# Patient Record
Sex: FEMALE | Race: WHITE | Employment: OTHER | URBAN - METROPOLITAN AREA
[De-identification: names, ages, dates, MRNs, and addresses within clinical notes are randomized per-mention and may not be internally consistent; named-entity substitution may affect disease eponyms.]

---

## 2017-03-05 ENCOUNTER — OFFICE VISIT (OUTPATIENT)
Dept: FAMILY MEDICINE CLINIC | Age: 52
End: 2017-03-05

## 2017-03-05 VITALS
OXYGEN SATURATION: 97 % | BODY MASS INDEX: 30.92 KG/M2 | WEIGHT: 204 LBS | TEMPERATURE: 96.8 F | HEIGHT: 68 IN | HEART RATE: 88 BPM | RESPIRATION RATE: 18 BRPM | SYSTOLIC BLOOD PRESSURE: 129 MMHG | DIASTOLIC BLOOD PRESSURE: 84 MMHG

## 2017-03-05 DIAGNOSIS — R21 RASH AND NONSPECIFIC SKIN ERUPTION: Primary | ICD-10-CM

## 2017-03-05 RX ORDER — MUPIROCIN 20 MG/G
OINTMENT TOPICAL 2 TIMES DAILY
Qty: 22 G | Refills: 0 | Status: SHIPPED | OUTPATIENT
Start: 2017-03-05 | End: 2017-03-10

## 2017-03-05 RX ORDER — DEXTROAMPHETAMINE SACCHARATE, AMPHETAMINE ASPARTATE MONOHYDRATE, DEXTROAMPHETAMINE SULFATE AND AMPHETAMINE SULFATE 6.25; 6.25; 6.25; 6.25 MG/1; MG/1; MG/1; MG/1
25 CAPSULE, EXTENDED RELEASE ORAL
COMMUNITY

## 2017-03-05 RX ORDER — HYDROCORTISONE VALERATE 2 MG/G
OINTMENT TOPICAL
Qty: 15 G | Refills: 0 | Status: SHIPPED | OUTPATIENT
Start: 2017-03-05 | End: 2022-06-28

## 2017-03-05 NOTE — PROGRESS NOTES
Azar Lopez is a 45 y.o.  female and presents with    Chief Complaint   Patient presents with    Rash     Subjective:  Rash    The history is provided by the patient. This is a new problem. The current episode started more than 1 week ago. The problem has been gradually worsening. The problem is associated with nothing. There has been no fever. The rash is present on the chest and neck. The patient is experiencing no pain. Associated symptoms include itching. She has tried oral antihistamines for the symptoms. The treatment provided no relief. There is no problem list on file for this patient. There are no active problems to display for this patient. Current Outpatient Prescriptions   Medication Sig Dispense Refill    amphetamine-dextroamphetamine XR (ADDERALL XR) 25 mg XR capsule Take 25 mg by mouth every morning. No Known Allergies  History reviewed. No pertinent past medical history. History reviewed. No pertinent surgical history. Family History   Problem Relation Age of Onset    Diabetes Father      Social History   Substance Use Topics    Smoking status: Former Smoker     Quit date: 2001    Smokeless tobacco: Not on file    Alcohol use Not on file       ROS   Review of Systems   Constitutional: Negative for fever. HENT: Negative for ear pain and sore throat. Respiratory: Negative for cough. Cardiovascular: Negative for chest pain. Skin: Positive for itching and rash. Neurological: Negative for headaches. All other systems reviewed and are negative. Objective:  Vitals:    03/05/17 1402   BP: 129/84   Pulse: 88   Resp: 18   Temp: 96.8 °F (36 °C)   SpO2: 97%   Weight: 204 lb (92.5 kg)   Height: 5' 7.5\" (1.715 m)   LMP: 01/20/2017     Physical Exam   Constitutional: She appears well-developed and well-nourished. HENT:   Head: Normocephalic. Neck: Normal range of motion. Neck supple. Cardiovascular: Normal rate and regular rhythm.     Pulmonary/Chest: Effort normal and breath sounds normal.   Lymphadenopathy:     She has no cervical adenopathy. Skin: Skin is warm and dry. Rash noted. Papular erythematous confluent rash to chest and neck in a pattern consistent with a shirt line. Assessment/Plan:    1. Rash and nonspecific skin eruption  Most likely a photoallergic reaction, although patient has never has similar reactions. I will also cover for a bacterial infection with bactroban. Orders Placed This Encounter    hydrocortisone valerate (WEST-JESSICA) 0.2 % ointment     Sig: use thin layer twice a day. Dispense:  15 g     Refill:  0    mupirocin (BACTROBAN) 2 % ointment     Sig: Apply  to affected area two (2) times a day for 5 days. Dispense:  22 g     Refill:  0       I have discussed the diagnosis with the patient and the intended plan as seen in the above orders. The patient has received an after-visit summary and questions were answered concerning future plans. I have discussed medication side effects and warnings with the patient as well. I have reviewed the plan of care with the patient, accepted their input and they are in agreement with the treatment goals.

## 2017-03-05 NOTE — MR AVS SNAPSHOT
Visit Information Date & Time Provider Department Dept. Phone Encounter #  
 3/5/2017  1:45 PM [place] [id number] [phone] [id number] Upcoming Health Maintenance Date Due DTaP/Tdap/Td series (1 - Tdap) 11/2/1985 PAP AKA CERVICAL CYTOLOGY 11/2/1985 BREAST CANCER SCRN MAMMOGRAM 11/2/2014 FOBT Q 1 YEAR AGE 50-75 11/2/2014 INFLUENZA AGE 9 TO ADULT 8/1/2016 Allergies as of 3/5/2017  Review Complete On: 3/5/2017 By: Alex Conklin NP No Known Allergies Current Immunizations  Never Reviewed No immunizations on file. Not reviewed this visit You Were Diagnosed With   
  
 Codes Comments Rash and nonspecific skin eruption    -  Primary ICD-10-CM: R21 
ICD-9-CM: 782.1 Vitals BP Pulse Temp Resp Height(growth percentile) Weight(growth percentile) 129/84 88 96.8 °F (36 °C) 18 5' 7.5\" (1.715 m) 204 lb (92.5 kg) LMP SpO2 BMI OB Status Smoking Status 01/20/2017 97% 31.48 kg/m2 Perimenopausal Former Smoker BMI and BSA Data Body Mass Index Body Surface Area  
 31.48 kg/m 2 2.1 m 2 Preferred Pharmacy Pharmacy Name Phone FARM [facility] [address] [place] [address] [phone] Your Updated Medication List  
  
   
This list is accurate as of: 3/5/17  2:08 PM.  Always use your most recent med list.  
  
  
  
  
 ADDERALL XR 25 mg XR capsule Generic drug:  amphetamine-dextroamphetamine XR Take 25 mg by mouth every morning. hydrocortisone valerate 0.2 % ointment Commonly known as:  WEST-JESSICA  
use thin layer twice a day. mupirocin 2 % ointment Commonly known as:  [facility] Apply  to affected area two (2) times a day for 5 days. Prescriptions Sent to Pharmacy Refills  
 hydrocortisone valerate (WEST-JESSICA) 0.2 % ointment 0 Sig: use thin layer twice a day.   
 Class: Normal  
 Pharmacy: [facility] Ph #: [phone]  
 mupirocin (BACTROBAN) 2 % ointment 0 Sig: Apply  to affected area two (2) times a day for 5 days. Class: Normal  
 Pharmacy: [address] [place] [address] [address] Ph #: [phone] Route: Topical  
  
Patient Instructions Triamcinolone (On the skin) Triamcinolone (trye-am-SIN-oh-lone) Treats skin itching, swelling, and other discomfort. This medicine is a corticosteroid. Brand Name(s):DermaSilkRx SDS Venkatesh, DermacinRx SilaPak, DermacinRx oneforty, Dermasorb TA Complete Kit, Kenalog, Pediaderm TA, Triamcot, [place], [place], [place] TDPak There may be other brand names for this medicine. When This Medicine Should Not Be Used: You should not use this medicine if you have had an allergic reaction to triamcinolone. How to Use This Medicine:  
Cream, Lotion, Ointment, Spray · Your doctor will tell you how much medicine to use. Do not use more than directed. · Use this medicine only on your skin. Rinse it off right away if it gets on a cut or scrape. Do not get the medicine in your eyes, nose, or mouth. · If you or your child are using the spray form on or near the face, protect your nose to avoid breathing it in and make sure that your eyes are covered. · Do not use this medicine on the face, neck, groin, or underarms unless directed to do so by your doctor. · Wash your hands with soap and water before and after you use this medicine. · Apply a thin layer of the medicine to the affected area. Rub it in gently. · Do not cover the treated area with a bandage unless directed by your doctor. · If the medicine is applied to the diaper area of an infant, do not use tight-fitting diapers or plastic pants unless directed to do so by your doctor. · The spray form is flammable until it dries on the skin.  Do not use it near heat, an open flame, or while smoking. Do not puncture, break, or burn the aerosol can. · Read and follow the patient instructions that come with this medicine. Talk to your doctor or pharmacist if you have any questions. If a dose is missed:  
· Apply a dose as soon as you can. If it is almost time for your next dose, wait until then and apply a regular dose. Do not apply extra medicine to make up for a missed dose. How to Store and Dispose of This Medicine: · Store the medicine in a closed container at room temperature, away from heat, moisture, and direct light. · Ask your pharmacist or doctor how to dispose of the medicine container and any leftover or  medicine. · Keep all medicine out of the reach of children. Never share your medicine with anyone. Drugs and Foods to Avoid: Ask your doctor or pharmacist before using any other medicine, including over-the-counter medicines, vitamins, and herbal products. · Do not put cosmetics or skin care products on the treated skin. · Do not use this medication with other corticosteroid (eg, hydrocortisone) containing products without checking with your doctor first. 
Warnings While Using This Medicine: · Make sure your doctor knows if you are pregnant or breastfeeding, or if you have diabetes, glaucoma, increased pressure in the head, skin infection or problems, or an adrenal problem called Cushing's syndrome. · Using too much of this medicine or using it for a long time may increase your risk of having adrenal gland problems. The risk is greater for children and patients who use large amounts for a long time. Talk to your doctor right away if you or your child have more than one of these symptoms while you are using this medicine: blurred vision; dizziness or fainting; a fast, uneven, or pounding heartbeat; increased thirst or urination; irritability; or unusual tiredness or weakness. · Stop using this medicine and check with your doctor right away if you or your child have a skin rash, burning, stinging, swelling, or irritation on the skin. · You should not use this medicine for your child without a doctor's approval. 
· Do not use this medicine to treat a skin problem your doctor has not examined. · Call your doctor if your symptoms do not improve or if they get worse. · Your doctor will check your progress and the effects of this medicine at regular visits. Keep all appointments. Blood and urine tests may be needed to check for unwanted effects. Possible Side Effects While Using This Medicine:  
Call your doctor right away if you notice any of these side effects: · Allergic reaction: Itching or hives, swelling in your face or hands, swelling or tingling in your mouth or throat, chest tightness, trouble breathing · Itching, flaking, or dryness of the treated skin area. · Severe burning, pain, redness, swelling, or irritation of the treated skin areas. · Symptoms of skin infection such as redness, swelling, drainage, or pus. If you notice these less serious side effects, talk with your doctor: · Acne or tiny pimples on the skin. · Changes in the color of the treated skin. · Excessive hair growth. · Itching and redness around your lips. · Mild burning, dryness, irritation, redness, or itching. · Mild, temporary stinging. · Raised spots on the skin. · Thinning of the skin or bruising. If you notice other side effects that you think are caused by this medicine, tell your doctor. Call your doctor for medical advice about side effects. You may report side effects to FDA at 4-299-FDA-7798 © 2016 [address] is for End User's use only and may not be sold, redistributed or otherwise used for commercial purposes. The above information is an  only. It is not intended as medical advice for individual conditions or treatments.  Talk to your doctor, nurse or pharmacist before following any medical regimen to see if it is safe and effective for you. Introducing [facility]! Vishal Ortiz introduces InCytu patient portal. Now you can access parts of your medical record, email your doctor's office, and request medication refills online. 1. In your internet browser, go to [email or web address] 2. Click on the First Time User? Click Here link in the Sign In box. You will see the New Member Sign Up page. 3. Enter your InCytu Access Code exactly as it appears below. You will not need to use this code after youve completed the sign-up process. If you do not sign up before the expiration date, you must request a new code. · InCytu Access Code: [email or web address] Expires: 6/3/2017  2:08 PM 
 
4. Enter the last four digits of your Social Security Number (xxxx) and Date of Birth (mm/dd/yyyy) as indicated and click Submit. You will be taken to the next sign-up page. 5. Create a InCytu ID. This will be your InCytu login ID and cannot be changed, so think of one that is secure and easy to remember. 6. Create a InC[employer]u password. You can change your password at any time. 7. Enter your Password Reset Question and Answer. This can be used at a later time if you forget your password. 8. Enter your e-mail address. You will receive e-mail notification when new information is available in [address]. 9. Click Sign Up. You can now view and download portions of your medical record. 10. Click the Download Summary menu link to download a portable copy of your medical information. If you have questions, please visit the Frequently Asked Questions section of the InC[employer]u website. Remember, InC[employer]u is NOT to be used for urgent needs. For medical emergencies, dial 911. Now available from your iPhone and Android! Please provide this summary of care documentation to your next provider. If you have any questions after today's visit, please call [phone].

## 2017-03-05 NOTE — PATIENT INSTRUCTIONS
Triamcinolone (On the skin)   Triamcinolone (trye-am-SIN-oh-lone)  Treats skin itching, swelling, and other discomfort. This medicine is a corticosteroid. Brand Name(s):DermaSilkRx SDS Venkatesh, DermacinRx SilaPak, DermacinRx Casentric, Dermasorb TA Complete Kit, Kenalog, Pediaderm TA, [place], [place], Precious Alanis TDPak   There may be other brand names for this medicine. When This Medicine Should Not Be Used: You should not use this medicine if you have had an allergic reaction to triamcinolone. How to Use This Medicine:   Cream, Lotion, Ointment, Spray  · Your doctor will tell you how much medicine to use. Do not use more than directed. · Use this medicine only on your skin. Rinse it off right away if it gets on a cut or scrape. Do not get the medicine in your eyes, nose, or mouth. · If you or your child are using the spray form on or near the face, protect your nose to avoid breathing it in and make sure that your eyes are covered. · Do not use this medicine on the face, neck, groin, or underarms unless directed to do so by your doctor. · Wash your hands with soap and water before and after you use this medicine. · Apply a thin layer of the medicine to the affected area. Rub it in gently. · Do not cover the treated area with a bandage unless directed by your doctor. · If the medicine is applied to the diaper area of an infant, do not use tight-fitting diapers or plastic pants unless directed to do so by your doctor. · The spray form is flammable until it dries on the skin. Do not use it near heat, an open flame, or while smoking. Do not puncture, break, or burn the aerosol can. · Read and follow the patient instructions that come with this medicine. Talk to your doctor or pharmacist if you have any questions. If a dose is missed:   · Apply a dose as soon as you can. If it is almost time for your next dose, wait until then and apply a regular dose.  Do not apply extra medicine to make up for a missed dose. How to Store and Dispose of This Medicine:   · Store the medicine in a closed container at room temperature, away from heat, moisture, and direct light. · Ask your pharmacist or doctor how to dispose of the medicine container and any leftover or  medicine. · Keep all medicine out of the reach of children. Never share your medicine with anyone. Drugs and Foods to Avoid:   Ask your doctor or pharmacist before using any other medicine, including over-the-counter medicines, vitamins, and herbal products. · Do not put cosmetics or skin care products on the treated skin. · Do not use this medication with other corticosteroid (eg, hydrocortisone) containing products without checking with your doctor first.  Warnings While Using This Medicine:   · Make sure your doctor knows if you are pregnant or breastfeeding, or if you have diabetes, glaucoma, increased pressure in the head, skin infection or problems, or an adrenal problem called Cushing's syndrome. · Using too much of this medicine or using it for a long time may increase your risk of having adrenal gland problems. The risk is greater for children and patients who use large amounts for a long time. Talk to your doctor right away if you or your child have more than one of these symptoms while you are using this medicine: blurred vision; dizziness or fainting; a fast, uneven, or pounding heartbeat; increased thirst or urination; irritability; or unusual tiredness or weakness. · Stop using this medicine and check with your doctor right away if you or your child have a skin rash, burning, stinging, swelling, or irritation on the skin. · You should not use this medicine for your child without a doctor's approval.  · Do not use this medicine to treat a skin problem your doctor has not examined. · Call your doctor if your symptoms do not improve or if they get worse.   · Your doctor will check your progress and the effects of this medicine at regular visits. Keep all appointments. Blood and urine tests may be needed to check for unwanted effects. Possible Side Effects While Using This Medicine:   Call your doctor right away if you notice any of these side effects:  · Allergic reaction: Itching or hives, swelling in your face or hands, swelling or tingling in your mouth or throat, chest tightness, trouble breathing  · Itching, flaking, or dryness of the treated skin area. · Severe burning, pain, redness, swelling, or irritation of the treated skin areas. · Symptoms of skin infection such as redness, swelling, drainage, or pus. If you notice these less serious side effects, talk with your doctor:   · Acne or tiny pimples on the skin. · Changes in the color of the treated skin. · Excessive hair growth. · Itching and redness around your lips. · Mild burning, dryness, irritation, redness, or itching. · Mild, temporary stinging. · Raised spots on the skin. · Thinning of the skin or bruising. If you notice other side effects that you think are caused by this medicine, tell your doctor. Call your doctor for medical advice about side effects. You may report side effects to FDA at 0-403-FDA-1859  © 2016 [address] is for End User's use only and may not be sold, redistributed or otherwise used for commercial purposes. The above information is an  only. It is not intended as medical advice for individual conditions or treatments. Talk to your doctor, nurse or pharmacist before following any medical regimen to see if it is safe and effective for you.

## 2018-04-12 ENCOUNTER — IMPORTED ENCOUNTER (OUTPATIENT)
Dept: URBAN - METROPOLITAN AREA CLINIC 1 | Facility: CLINIC | Age: 53
End: 2018-04-12

## 2018-04-12 PROBLEM — H52.4: Noted: 2018-04-12

## 2018-04-12 PROCEDURE — S0620 ROUTINE OPHTHALMOLOGICAL EXA: HCPCS

## 2018-04-12 NOTE — PATIENT DISCUSSION
1.  Presbyopia: Rx was given for corrective spectacles if indicated. 2.  HX of LASIK OU3. JOCELYNN OU-REC patient to use AT BID 4. Amanda Geller. Return for an appointment in 1 week for Contact lens check. with Dr. Kayode Veliz. 6.  Return for an appointment in 1 year for 39 and Contact lens check. with Dr. Kayode Veliz.

## 2022-04-02 ASSESSMENT — KERATOMETRY
OS_AXISANGLE2_DEGREES: 117
OS_K2POWER_DIOPTERS: 43.75
OD_K2POWER_DIOPTERS: 44.50
OD_AXISANGLE2_DEGREES: 103
OD_AXISANGLE_DEGREES: 013
OD_K1POWER_DIOPTERS: 44.00
OS_K1POWER_DIOPTERS: 44.25
OS_AXISANGLE_DEGREES: 027

## 2022-04-02 ASSESSMENT — VISUAL ACUITY
OS_SC: J10
OS_CC: 20/20
OD_SC: J10
OD_CC: 20/20

## 2022-04-02 ASSESSMENT — TONOMETRY
OS_IOP_MMHG: 13
OD_IOP_MMHG: 13

## 2023-01-31 RX ORDER — METHOCARBAMOL 750 MG/1
750 TABLET, FILM COATED ORAL 4 TIMES DAILY
COMMUNITY
Start: 2022-06-28

## 2023-01-31 RX ORDER — LIDOCAINE 50 MG/G
PATCH TOPICAL
COMMUNITY
Start: 2022-06-28

## 2023-01-31 RX ORDER — TEMAZEPAM 7.5 MG/1
CAPSULE ORAL
COMMUNITY

## 2023-01-31 RX ORDER — DEXTROAMPHETAMINE SACCHARATE, AMPHETAMINE ASPARTATE MONOHYDRATE, DEXTROAMPHETAMINE SULFATE AND AMPHETAMINE SULFATE 6.25; 6.25; 6.25; 6.25 MG/1; MG/1; MG/1; MG/1
25 CAPSULE, EXTENDED RELEASE ORAL
COMMUNITY

## 2023-01-31 RX ORDER — IBUPROFEN 600 MG/1
600 TABLET ORAL EVERY 6 HOURS PRN
COMMUNITY
Start: 2022-06-28

## 2025-07-19 ENCOUNTER — HOSPITAL ENCOUNTER (EMERGENCY)
Facility: HOSPITAL | Age: 60
Discharge: HOME | End: 2025-07-19
Attending: EMERGENCY MEDICINE
Payer: COMMERCIAL

## 2025-07-19 ENCOUNTER — APPOINTMENT (OUTPATIENT)
Facility: HOSPITAL | Age: 60
End: 2025-07-19
Payer: COMMERCIAL

## 2025-07-19 VITALS
SYSTOLIC BLOOD PRESSURE: 141 MMHG | DIASTOLIC BLOOD PRESSURE: 84 MMHG | TEMPERATURE: 98.1 F | HEART RATE: 87 BPM | RESPIRATION RATE: 16 BRPM | OXYGEN SATURATION: 99 %

## 2025-07-19 DIAGNOSIS — S06.0X0A CONCUSSION WITHOUT LOSS OF CONSCIOUSNESS, INITIAL ENCOUNTER: ICD-10-CM

## 2025-07-19 DIAGNOSIS — S09.90XA CLOSED HEAD INJURY, INITIAL ENCOUNTER: Primary | ICD-10-CM

## 2025-07-19 LAB
ALBUMIN SERPL-MCNC: 4.1 G/DL (ref 3.5–5)
ALBUMIN/GLOB SERPL: 1.1 (ref 1.1–2.2)
ALP SERPL-CCNC: 96 U/L (ref 45–117)
ALT SERPL-CCNC: 48 U/L (ref 12–78)
ANION GAP SERPL CALC-SCNC: 11 MMOL/L (ref 2–12)
AST SERPL-CCNC: 36 U/L (ref 15–37)
BASOPHILS # BLD: 0.07 K/UL (ref 0–0.1)
BASOPHILS NFR BLD: 0.9 % (ref 0–1)
BILIRUB SERPL-MCNC: 0.5 MG/DL (ref 0.2–1)
BUN SERPL-MCNC: 7 MG/DL (ref 6–20)
BUN/CREAT SERPL: 9 (ref 12–20)
CALCIUM SERPL-MCNC: 9.1 MG/DL (ref 8.5–10.1)
CHLORIDE SERPL-SCNC: 102 MMOL/L (ref 97–108)
CK SERPL-CCNC: 94 U/L (ref 26–192)
CO2 SERPL-SCNC: 27 MMOL/L (ref 21–32)
COMMENT:: NORMAL
CREAT SERPL-MCNC: 0.77 MG/DL (ref 0.55–1.02)
DIFFERENTIAL METHOD BLD: ABNORMAL
EKG ATRIAL RATE: 80 BPM
EKG DIAGNOSIS: NORMAL
EKG P AXIS: 55 DEGREES
EKG P-R INTERVAL: 150 MS
EKG Q-T INTERVAL: 394 MS
EKG QRS DURATION: 76 MS
EKG QTC CALCULATION (BAZETT): 454 MS
EKG R AXIS: 63 DEGREES
EKG T AXIS: 52 DEGREES
EKG VENTRICULAR RATE: 80 BPM
EOSINOPHIL # BLD: 0.26 K/UL (ref 0–0.4)
EOSINOPHIL NFR BLD: 3.4 % (ref 0–7)
ERYTHROCYTE [DISTWIDTH] IN BLOOD BY AUTOMATED COUNT: 12.4 % (ref 11.5–14.5)
ETHANOL SERPL-MCNC: <10 MG/DL (ref 0–0.08)
GLOBULIN SER CALC-MCNC: 3.6 G/DL (ref 2–4)
GLUCOSE SERPL-MCNC: 101 MG/DL (ref 65–100)
HCT VFR BLD AUTO: 50.1 % (ref 35–47)
HGB BLD-MCNC: 17.1 G/DL (ref 11.5–16)
IMM GRANULOCYTES # BLD AUTO: 0.03 K/UL (ref 0–0.04)
IMM GRANULOCYTES NFR BLD AUTO: 0.4 % (ref 0–0.5)
LYMPHOCYTES # BLD: 1.56 K/UL (ref 0.8–3.5)
LYMPHOCYTES NFR BLD: 20.4 % (ref 12–49)
MAGNESIUM SERPL-MCNC: 2.1 MG/DL (ref 1.6–2.4)
MCH RBC QN AUTO: 31.9 PG (ref 26–34)
MCHC RBC AUTO-ENTMCNC: 34.1 G/DL (ref 30–36.5)
MCV RBC AUTO: 93.5 FL (ref 80–99)
MONOCYTES # BLD: 0.48 K/UL (ref 0–1)
MONOCYTES NFR BLD: 6.3 % (ref 5–13)
NEUTS SEG # BLD: 5.24 K/UL (ref 1.8–8)
NEUTS SEG NFR BLD: 68.6 % (ref 32–75)
NRBC # BLD: 0 K/UL (ref 0–0.01)
NRBC BLD-RTO: 0 PER 100 WBC
PLATELET # BLD AUTO: 295 K/UL (ref 150–400)
PMV BLD AUTO: 9.5 FL (ref 8.9–12.9)
POTASSIUM SERPL-SCNC: 3.8 MMOL/L (ref 3.5–5.1)
PROT SERPL-MCNC: 7.7 G/DL (ref 6.4–8.2)
RBC # BLD AUTO: 5.36 M/UL (ref 3.8–5.2)
SODIUM SERPL-SCNC: 140 MMOL/L (ref 136–145)
SPECIMEN HOLD: NORMAL
TROPONIN I SERPL HS-MCNC: 5 NG/L (ref 0–51)
WBC # BLD AUTO: 7.6 K/UL (ref 3.6–11)

## 2025-07-19 PROCEDURE — 99284 EMERGENCY DEPT VISIT MOD MDM: CPT

## 2025-07-19 PROCEDURE — 72125 CT NECK SPINE W/O DYE: CPT

## 2025-07-19 PROCEDURE — 80053 COMPREHEN METABOLIC PANEL: CPT

## 2025-07-19 PROCEDURE — 84484 ASSAY OF TROPONIN QUANT: CPT

## 2025-07-19 PROCEDURE — 2580000003 HC RX 258: Performed by: EMERGENCY MEDICINE

## 2025-07-19 PROCEDURE — 96360 HYDRATION IV INFUSION INIT: CPT

## 2025-07-19 PROCEDURE — 85025 COMPLETE CBC W/AUTO DIFF WBC: CPT

## 2025-07-19 PROCEDURE — 93005 ELECTROCARDIOGRAM TRACING: CPT | Performed by: EMERGENCY MEDICINE

## 2025-07-19 PROCEDURE — 96361 HYDRATE IV INFUSION ADD-ON: CPT

## 2025-07-19 PROCEDURE — 82077 ASSAY SPEC XCP UR&BREATH IA: CPT

## 2025-07-19 PROCEDURE — 82550 ASSAY OF CK (CPK): CPT

## 2025-07-19 PROCEDURE — 83735 ASSAY OF MAGNESIUM: CPT

## 2025-07-19 PROCEDURE — 6370000000 HC RX 637 (ALT 250 FOR IP): Performed by: EMERGENCY MEDICINE

## 2025-07-19 PROCEDURE — 70450 CT HEAD/BRAIN W/O DYE: CPT

## 2025-07-19 RX ORDER — SODIUM CHLORIDE, SODIUM LACTATE, POTASSIUM CHLORIDE, AND CALCIUM CHLORIDE .6; .31; .03; .02 G/100ML; G/100ML; G/100ML; G/100ML
1000 INJECTION, SOLUTION INTRAVENOUS ONCE
Status: COMPLETED | OUTPATIENT
Start: 2025-07-19 | End: 2025-07-19

## 2025-07-19 RX ORDER — CYCLOBENZAPRINE HCL 10 MG
10 TABLET ORAL
Status: COMPLETED | OUTPATIENT
Start: 2025-07-19 | End: 2025-07-19

## 2025-07-19 RX ADMIN — SODIUM CHLORIDE, SODIUM LACTATE, POTASSIUM CHLORIDE, AND CALCIUM CHLORIDE 1000 ML: .6; .31; .03; .02 INJECTION, SOLUTION INTRAVENOUS at 13:29

## 2025-07-19 RX ADMIN — CYCLOBENZAPRINE 10 MG: 10 TABLET, FILM COATED ORAL at 15:20

## 2025-07-19 RX ADMIN — SODIUM CHLORIDE, SODIUM LACTATE, POTASSIUM CHLORIDE, AND CALCIUM CHLORIDE 1000 ML: .6; .31; .03; .02 INJECTION, SOLUTION INTRAVENOUS at 12:30

## 2025-07-19 ASSESSMENT — LIFESTYLE VARIABLES
HOW MANY STANDARD DRINKS CONTAINING ALCOHOL DO YOU HAVE ON A TYPICAL DAY: PATIENT DOES NOT DRINK
HOW OFTEN DO YOU HAVE A DRINK CONTAINING ALCOHOL: NEVER

## 2025-07-19 ASSESSMENT — PAIN DESCRIPTION - LOCATION: LOCATION: HEAD

## 2025-07-19 ASSESSMENT — PAIN SCALES - GENERAL: PAINLEVEL_OUTOF10: 6

## 2025-07-19 ASSESSMENT — PAIN DESCRIPTION - ORIENTATION: ORIENTATION: POSTERIOR

## 2025-07-19 ASSESSMENT — PAIN DESCRIPTION - PAIN TYPE: TYPE: ACUTE PAIN

## 2025-07-19 ASSESSMENT — PAIN - FUNCTIONAL ASSESSMENT: PAIN_FUNCTIONAL_ASSESSMENT: PREVENTS OR INTERFERES SOME ACTIVE ACTIVITIES AND ADLS

## 2025-07-19 ASSESSMENT — PAIN DESCRIPTION - DESCRIPTORS: DESCRIPTORS: ACHING;DISCOMFORT

## 2025-07-19 ASSESSMENT — PAIN DESCRIPTION - ONSET: ONSET: PROGRESSIVE

## 2025-07-19 ASSESSMENT — PAIN DESCRIPTION - FREQUENCY: FREQUENCY: CONTINUOUS

## 2025-07-19 NOTE — ED NOTES
Patient discharged by Boy GONZALEZ pt sent to the front lobby, with strong and steady gait, no acute distress noted at time of discharge - Discharge information / home RX / and reasons to return to the ED were reviewed by the ED provider.      Pt's daughters x2 at side for comfort care and for a ride home.

## 2025-07-19 NOTE — ED PROVIDER NOTES
EMERGENCY DEPARTMENT PHYSICIAN NOTE     Patient: Arabella Zhang     Time of Service: 2025 11:56 AM     Chief complaint:   Chief Complaint   Patient presents with    Head Injury           Headache    Dizziness        HISTORY:  Patient is a 59 y.o. female who presents to the emergency department with complaints of fall, head injury.       Past Medical History:   Diagnosis Date    Hypertension         No past surgical history on file.     Family History   Problem Relation Age of Onset    Heart Disease Father     Diabetes Father     Liver Disease Mother         Social History     Socioeconomic History    Marital status:    Tobacco Use    Smoking status: Former     Current packs/day: 0.00     Types: Cigarettes     Quit date: 2001     Years since quittin.5   Substance and Sexual Activity    Alcohol use: Yes    Drug use: Not Currently        Current Medications: Reviewed in chart.    Allergies: No Known Allergies       REVIEW OF SYSTEMS: See HPI for pertinent positives and negatives.      PHYSICAL EXAM:  BP (!) 141/84   Pulse 87   Temp 98.1 °F (36.7 °C) (Oral)   Resp 16   SpO2 99%    Physical Exam  Vitals and nursing note reviewed.   Constitutional:       General: She is not in acute distress.     Appearance: Normal appearance. She is normal weight. She is not toxic-appearing.   HENT:      Head: Normocephalic and atraumatic.      Nose: Nose normal.      Mouth/Throat:      Mouth: Mucous membranes are moist.      Pharynx: Oropharynx is clear.   Eyes:      Extraocular Movements: Extraocular movements intact.      Conjunctiva/sclera: Conjunctivae normal.      Pupils: Pupils are equal, round, and reactive to light.   Cardiovascular:      Rate and Rhythm: Normal rate and regular rhythm.      Pulses: Normal pulses.      Heart sounds: Normal heart sounds.   Pulmonary:      Effort: Pulmonary effort is normal.      Breath sounds: Normal breath sounds. No wheezing.   Abdominal:      General: Abdomen is

## 2025-07-19 NOTE — ED TRIAGE NOTES
ED visit d/ while at a pool outdoor setting, pt ambulated towards the restroom where she had a GLF, fell back backward with posterior head hematoma, no active bleeding, also with frontal HA at this time - EMS concerned for periods of somnolence en route - no PIV on arrival -  by EMS - onset of sxs, PTA - Denies sob / cp / active dizziness / coughing / sick contacts / N / V / D;;

## 2025-07-19 NOTE — DISCHARGE INSTRUCTIONS
I recommend you focus on physical and mental rest as well as hydration with plenty of clear fluids.    Since you have had a concussion it is not unusual to experience difficulty with attention, concentration, some lapses in memory, or mild headaches for the next few days.    It is very important that you return to the emergency department for repeat evaluation if you experience any persistent or worsening conditions such as persistent nausea/vomiting, worsening headache which does not improve with hydration and rest, change in vision.    I do recommend that you limit your straining as well as your screen time and anything else that demands significant amount of attention for the next few days until your able to follow-up with your primary care physician.    Routine appointments for health maintenance with a primary care provider are very important and emergency department visits are no substitute.  You should review all findings and test results from your visit today with your primary care physician.    We recommended that you take medications as prescribed.     Please do not drive, operate heavy machinery, swim, bathe or perform any other activities as you may risk injury to yourself or others.    You may take 1 or 2 pills of Tylenol every 6 hours as needed for pain or fever.  You should not take this medication with other medications that contain acetaminophen/Tylenol which could include prescription pain medications or other combo over-the-counter medications.  You should not exceed more than 4000 mg in a 24 hour.    You may use 800 mg of ibuprofen every 6 hours as needed for pain or fever.  You should NOT take this medication if you're taking other NSAID/anti-inflammatory medications or on steroids or other blood thinning medications.    Return to the emergency department for any new or concerning signs/symptoms or failure to improve.